# Patient Record
Sex: MALE | Race: WHITE | URBAN - METROPOLITAN AREA
[De-identification: names, ages, dates, MRNs, and addresses within clinical notes are randomized per-mention and may not be internally consistent; named-entity substitution may affect disease eponyms.]

---

## 2017-01-17 ENCOUNTER — TELEPHONE (OUTPATIENT)
Dept: OPHTHALMOLOGY | Facility: CLINIC | Age: 77
End: 2017-01-17

## 2017-03-16 ENCOUNTER — OFFICE VISIT (OUTPATIENT)
Dept: OPHTHALMOLOGY | Facility: CLINIC | Age: 77
End: 2017-03-16

## 2017-03-16 DIAGNOSIS — H02.839 DERMATOCHALASIS, UNSPECIFIED LATERALITY: ICD-10-CM

## 2017-03-16 DIAGNOSIS — H52.00 HYPERMETROPIA, UNSPECIFIED LATERALITY: ICD-10-CM

## 2017-03-16 DIAGNOSIS — E11.9 CONTROLLED TYPE 2 DIABETES MELLITUS WITHOUT COMPLICATION, WITHOUT LONG-TERM CURRENT USE OF INSULIN (H): ICD-10-CM

## 2017-03-16 DIAGNOSIS — H25.10 SENILE NUCLEAR SCLEROSIS, UNSPECIFIED LATERALITY: Primary | ICD-10-CM

## 2017-03-16 RX ORDER — TAMSULOSIN HYDROCHLORIDE 0.4 MG/1
1 CAPSULE ORAL DAILY
COMMUNITY

## 2017-03-16 RX ORDER — CLOPIDOGREL BISULFATE 75 MG/1
1 TABLET ORAL DAILY
COMMUNITY

## 2017-03-16 ASSESSMENT — EXTERNAL EXAM - LEFT EYE: OS_EXAM: NORMAL

## 2017-03-16 ASSESSMENT — TONOMETRY
OD_IOP_MMHG: 16
IOP_METHOD: APPLANATION
OS_IOP_MMHG: 17

## 2017-03-16 ASSESSMENT — EXTERNAL EXAM - RIGHT EYE: OD_EXAM: NORMAL

## 2017-03-16 ASSESSMENT — VISUAL ACUITY
OS_CC: 20/20
OD_CC: 20/20
METHOD: SNELLEN - LINEAR
CORRECTION_TYPE: GLASSES
OD_CC+: -2

## 2017-03-16 ASSESSMENT — REFRACTION_WEARINGRX
OD_AXIS: 030
SPECS_TYPE: PAL
OS_CYLINDER: +1.00
OS_SPHERE: +2.00
OS_AXIS: 153
OD_CYLINDER: +0.50
OD_ADD: +3.00
OS_ADD: +3.00
OD_SPHERE: +2.50

## 2017-03-16 ASSESSMENT — CONF VISUAL FIELD
OS_NORMAL: 1
OD_NORMAL: 1
METHOD: COUNTING FINGERS

## 2017-03-16 ASSESSMENT — REFRACTION_MANIFEST
OS_AXIS: 155
OS_SPHERE: +2.00
OD_AXIS: 030
OD_SPHERE: +2.50
OD_CYLINDER: +0.50
OS_CYLINDER: +1.00

## 2017-03-16 ASSESSMENT — CUP TO DISC RATIO
OS_RATIO: 0.35
OD_RATIO: 0.45

## 2017-03-16 ASSESSMENT — SLIT LAMP EXAM - LIDS
COMMENTS: UPPER LID DERMATOCHALASIS
COMMENTS: UPPER LID DERMATOCHALASIS

## 2017-03-16 NOTE — NURSING NOTE
Chief Complaints and History of Present Illnesses   Patient presents with     COMPREHENSIVE EYE EXAM     HPI    Affected eye(s):  Both   Symptoms:     No blurred vision      Duration:  1 year   Frequency:  Constant       Do you have eye pain now?:  No      Comments:  Complete eye exam.  No concerns.  Gloria EUCEDA 9:05 AM 03/16/2017

## 2017-03-16 NOTE — PROGRESS NOTES
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, unspecified laterality - Both Eyes  (primary encounter diagnosis)  Comment: Mild   Plan: Follow    (E11.9) Controlled type 2 diabetes mellitus without complication, without long-term current use of insulin (H)  Comment: Good control  Plan: Follow    (H02.839) Dermatochalasis, unspecified laterality - Both Eyes  Comment: Moderate  Plan: Refer as as necessary    (H52.00) Hypermetropia, unspecified laterality - Both Eyes  Comment: Little change  Plan: Rx     -----------------------------------------------------------------------------------      Patient disposition:   Return in about 1 year (around 3/16/2018) for Complete Eye Exam, Diabetes, Cataract. or sooner as needed.    Complete documentation of historical and exam elements from today's encounter can  be found in the full encounter summary report (not reduplicated in this progress  note). I personally obtained the chief complaint(s) and history of present illness. I  confirmed and edited as necessary the review of systems, past medical/surgical  history, family history, social history, and examination findings as documented by  others; and I examined the patient myself. I personally reviewed the relevant tests,  images, and reports as documented above. I formulated and edited as necessary the  assessment and plan and discussed the findings and management plan with the  patient and family.    BECKY Palafox M.D

## 2017-03-16 NOTE — MR AVS SNAPSHOT
After Visit Summary   3/16/2017    Rick Ivy    MRN: [id number]           Patient Information     Date Of Birth          1940        Visit Information        Provider Department      3/16/2017 9:00 AM Albert Palafox MD [facility]        Today's Diagnoses     Senile nuclear sclerosis, unspecified laterality - Both Eyes    -  1    Controlled type 2 diabetes mellitus without complication, without long-term current use of insulin (H)        Dermatochalasis, unspecified laterality - Both Eyes        Hypermetropia, unspecified laterality - Both Eyes           Follow-ups after your visit        Follow-up notes from your care team     Return in about 1 year (around 3/16/2018) for Complete Eye Exam, Diabetes, Cataract.      Who to contact     Please call your clinic at [phone] to:    Ask questions about your health    Make or cancel appointments    Discuss your medicines    Learn about your test results    Speak to your doctor   If you have compliments or concerns about an experience at your clinic, or if you wish to file a complaint, please contact [facility] Patient Relations at [phone] or email us at [email or web address]         Additional Information About Your Visit        MyChart Information     Ar[employer] is an electronic gateway that provides easy, online access to your medical records. With [employer], you can request a clinic appointment, read your test results, renew a prescription or communicate with your care team.     To sign up for [employer] visit the website at [email or web address]   You will be asked to enter the access code listed below, as well as some personal information. Please follow the directions to create your username and password.     Your access code is: [email or web address]  Expires: 2017 10:22 AM     Your access code will  in 90 days. If you need help or a new code, please contact  your [facility] or call [phone] for assistance.        Care EveryWhere ID     This is your Care EveryWhere ID. This could be used by other organizations to access your [facility] medical records  [id number]         Blood Pressure from Last 3 Encounters:   No data found for BP    Weight from Last 3 Encounters:   No data found for Wt              Today, you had the following     No orders found for display       Primary Care Provider Office Phone # Fax #    Karan La [phone]       [facility] [address]  [facility] [address]        Thank you!     Thank you for choosing [facility]  for your care. Our goal is always to provide you with excellent care. Hearing back from our patients is one way we can continue to improve our services. Please take a few minutes to complete the written survey that you may receive in the mail after your visit with us. Thank you!             Your Updated Medication List - Protect others around you: Learn how to safely use, store and throw away your medicines at www.disposemymeds.org.          This list is accurate as of: 3/16/17 12:29 PM.  Always use your most recent med list.                   Brand Name Dispense Instructions for use    ATENOLOL PO          BABY ASPIRIN PO      Take 1 tablet by mouth 2 times daily       clopidogrel 75 MG tablet    PLAVIX     Take 1 tablet by mouth daily       NORVASC PO          tamsulosin 0.4 MG capsule    FLOMAX     Take 1 capsule by mouth daily       UNKNOWN TO PATIENT      Blood pressure medication